# Patient Record
Sex: MALE
[De-identification: names, ages, dates, MRNs, and addresses within clinical notes are randomized per-mention and may not be internally consistent; named-entity substitution may affect disease eponyms.]

---

## 2021-09-05 PROBLEM — Z00.00 ENCOUNTER FOR PREVENTIVE HEALTH EXAMINATION: Status: ACTIVE | Noted: 2021-09-05

## 2021-09-07 ENCOUNTER — APPOINTMENT (OUTPATIENT)
Dept: OTOLARYNGOLOGY | Facility: CLINIC | Age: 61
End: 2021-09-07

## 2024-12-05 ENCOUNTER — EMERGENCY (EMERGENCY)
Facility: HOSPITAL | Age: 64
LOS: 1 days | Discharge: HOME | End: 2024-12-05
Admitting: STUDENT IN AN ORGANIZED HEALTH CARE EDUCATION/TRAINING PROGRAM
Payer: COMMERCIAL

## 2024-12-05 VITALS
SYSTOLIC BLOOD PRESSURE: 125 MMHG | TEMPERATURE: 98 F | RESPIRATION RATE: 18 BRPM | HEART RATE: 76 BPM | DIASTOLIC BLOOD PRESSURE: 82 MMHG | HEIGHT: 72 IN | OXYGEN SATURATION: 97 % | WEIGHT: 162.04 LBS

## 2024-12-05 DIAGNOSIS — Z23 ENCOUNTER FOR IMMUNIZATION: ICD-10-CM
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
## 2024-12-05 DIAGNOSIS — S71.131A PUNCTURE WOUND WITHOUT FOREIGN BODY, RIGHT THIGH, INITIAL ENCOUNTER: ICD-10-CM

## 2024-12-05 DIAGNOSIS — Z20.3 CONTACT WITH AND (SUSPECTED) EXPOSURE TO RABIES: ICD-10-CM
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
## 2024-12-05 PROCEDURE — 90675 RABIES VACCINE IM: CPT

## 2024-12-05 PROCEDURE — 96372 THER/PROPH/DIAG INJ SC/IM: CPT

## 2024-12-05 PROCEDURE — 90377 RABIES IG HT&SOL HUMAN IM/SC: CPT

## 2024-12-05 PROCEDURE — 90471 IMMUNIZATION ADMIN: CPT

## 2024-12-05 PROCEDURE — 99283 EMERGENCY DEPT VISIT LOW MDM: CPT | Mod: 25

## 2024-12-05 PROCEDURE — 99284 EMERGENCY DEPT VISIT MOD MDM: CPT

## 2024-12-05 RX ORDER — RABIES IMMUNE GLOBULIN/THIMER 150 UNIT/1
1450 VIAL (ML) INTRAMUSCULAR ONCE
Refills: 0 | Status: COMPLETED | OUTPATIENT
Start: 2024-12-05 | End: 2024-12-05

## 2024-12-05 RX ADMIN — Medication 1450 UNIT(S): at 14:15

## 2024-12-05 RX ADMIN — Medication 1 MILLILITER(S): at 14:12

## 2024-12-05 NOTE — ED ADULT TRIAGE NOTE - CHIEF COMPLAINT QUOTE
Pt presents to ED for a dog bite Sunday. Pt c/o redness and soreness on the dog bite site. Pt receives TDAP and received abx yesterday augmentin. Pt A&Ox4, NAD. Denies fever, chills. Sent here from  fro rabies shot.

## 2024-12-05 NOTE — ED PROVIDER NOTE - NSFOLLOWUPINSTRUCTIONS_ED_ALL_ED_FT
Return to ED for subsequent rabies vaccines:     Day 3: 12/8  Day 7: 12/12  Day 14: 12/19    Take antibiotics as prescribed      Rabies Vaccine    WHAT YOU NEED TO KNOW:    What is the rabies vaccine? The rabies vaccine can prevent rabies. Rabies is a serious illness caused by a virus. The rabies virus is spread to humans through the bite or scratch of an infected animal. Dogs, bats, skunks, coyotes, raccoons, and foxes are examples of animals that can carry rabies. The rabies vaccine can protect you from infection if you are at high risk of exposure. The vaccine can also prevent infection after you are bitten by an infected animal.    When is the vaccine given? The rabies vaccine is not part of the usual vaccination schedule. Your healthcare provider will give you an injection schedule. You should receive a vaccine if you have a higher risk of exposure to rabies. This might include people who work with animals who may be infected with rabies. You should also receive the vaccine after being bitten or scratched by an infected animal. The following is a common dosing schedule:    Before possible exposure to the virus, the vaccine is given in 2 doses. The first dose can be given at any time. The second dose is given 7 days after the first. You may need a booster dose within 3 years of the first 2 doses.    After exposure to the virus, the vaccine is usually given in 2 or 4 doses:  If you have received the rabies vaccine in the past, you usually only need 2 doses. The first is given immediately and the second is given 3 days later.    If you have not received the rabies vaccine, you need 4 doses over 2 weeks. The first dose is given as soon as possible after exposure to rabies. The following doses are given on days 3, 7, and 14. A shot called rabies immune globulin is given at the same time as the first dose. This medicine helps your immune system fight the infection.  What should I do if I miss a dose or will miss a scheduled dose? Call your healthcare provider right away. He or she will tell you what to do. The best way to be protected is to stay on the injection schedule given to you. This is especially important if you are getting the vaccine after exposure to the rabies virus. Reschedule any makeup dose or upcoming dose for as close to the original appointment as possible. Remember that you cannot get more than 1 dose on any day.    Who should not get the rabies vaccine or should wait to get it? Your healthcare provider may have you wait if you have not been exposed to rabies but are at high risk. If you have been exposed, you need to get the vaccine as soon as possible. Tell the provider if:    You had an allergic reaction to the rabies vaccine in the past, or to another vaccine.    You have any allergies.    You have a weakened immune system.    You take chloroquine or a similar medicine.    You are sick or have a fever of 101°F (38.3°C) or higher.  What are the risks of the rabies vaccine? The injection area may become red, tender, or swollen. You may develop a headache or muscle aches. You may have nausea or pain in your abdomen. You may have an allergic reaction to the vaccine. This can be life-threatening.    Call your local emergency number (911 in the [place]) or have someone call if:    Your mouth and throat are swollen.    You are wheezing or have trouble breathing.    You have chest pain or your heart is beating faster than normal for you.    You feel like you are going to faint.  When should I seek immediate care?    Your face is red or swollen.    You have hives that spread over your body.    You feel weak or dizzy.  When should I call my doctor?    You have increased pain, redness, or swelling around the injection area.    You have a headache, muscle aches, or abdominal pain.    You have flu-like symptoms.    You have questions or concerns about the rabies vaccine.  CARE AGREEMENT:    You have the right to help plan your care. Learn about your health condition and how it may be treated. Discuss treatment options with your healthcare providers to decide what care you want to receive. You always have the right to refuse treatment.  Return to ED sooner for pain, fever, chills, swelling, redness, drainage from the wound

## 2024-12-05 NOTE — ED ADULT NURSE NOTE - OBJECTIVE STATEMENT
64y male to the ER via triage from urgent care c/o of dog bite. Pt reports that was bit by a Maori Gregg on Sunday while in [facility]. Pt reports the site got red so pt went to urgent care- received tetanus shot and was sent for rabies vaccine.

## 2024-12-05 NOTE — ED PROVIDER NOTE - PATIENT PORTAL LINK FT
You can access the FollowMyHealth Patient Portal offered by [facility] by registering at the following website: [email or web address]. By joining [employer]’s FollowMyHealth portal, you will also be able to view your health information using other applications (apps) compatible with our system.

## 2024-12-05 NOTE — ED PROVIDER NOTE - OBJECTIVE STATEMENT
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 Pt. went to  on Wednesday and was given Augmentin and tetanus shot.  instructed pt to obtain rabies vaccine at ED. Vaccination status of dog is unknown. Denies any pain currently, fever, and chills. No other complaints at this time.

## 2024-12-05 NOTE — ED PROVIDER NOTE - PHYSICAL EXAMINATION
CONSTITUTIONAL: Well-appearing;  in no apparent distress.   HEAD: Normocephalic; atraumatic.   SKIN: R upper lateral thigh- healing puncture wound with scab formation and surrounding ecchymosis, no erythema, warmth or drainage

## 2024-12-05 NOTE — ED PROVIDER NOTE - CLINICAL SUMMARY MEDICAL DECISION MAKING FREE TEXT BOX
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 Pt. went to  on Wednesday and was given Augmentin and tetanus shot. R upper lateral thigh- healing puncture wound with scab formation and surrounding ecchymosis, no erythema, warmth or drainage. Vaccine and IG given

## 2024-12-08 ENCOUNTER — EMERGENCY (EMERGENCY)
Facility: HOSPITAL | Age: 64
LOS: 1 days | Discharge: HOME | End: 2024-12-08
Attending: STUDENT IN AN ORGANIZED HEALTH CARE EDUCATION/TRAINING PROGRAM | Admitting: EMERGENCY MEDICINE
Payer: COMMERCIAL

## 2024-12-08 VITALS
HEART RATE: 86 BPM | DIASTOLIC BLOOD PRESSURE: 85 MMHG | HEIGHT: 72 IN | TEMPERATURE: 97 F | SYSTOLIC BLOOD PRESSURE: 128 MMHG | WEIGHT: 162.04 LBS | RESPIRATION RATE: 18 BRPM | OXYGEN SATURATION: 98 %

## 2024-12-08 PROCEDURE — 90675 RABIES VACCINE IM: CPT

## 2024-12-08 PROCEDURE — 90471 IMMUNIZATION ADMIN: CPT

## 2024-12-08 PROCEDURE — L9995: CPT

## 2024-12-08 PROCEDURE — 99281 EMR DPT VST MAYX REQ PHY/QHP: CPT | Mod: 25

## 2024-12-08 RX ADMIN — Medication 1 MILLILITER(S): at 14:43

## 2024-12-08 NOTE — ED ADULT TRIAGE NOTE - CHIEF COMPLAINT QUOTE
"I need my second rabies vaccine". S/p dog bite on Thursday. Denies fevers, chills, bleeding or drainage, redness from area of bite. A/Ox4 .Ambulatory. Unlabored even respirations. NAD. "I need my second rabies vaccine". S/p dog bite on Thursday. Denies fevers, chills, bleeding or drainage, redness from area of bite. was prescribed Augmentin but has not been taking it because he left it at another residence far away. A/Ox4 .Ambulatory. Unlabored even respirations. NAD.

## 2024-12-08 NOTE — ED PROVIDER NOTE - OBJECTIVE STATEMENT
63 yo M presents for second rabies vaccination. He was bit by a dog to his right thigh on Sunday 12/1/24. He was seen at urgent care on Wednesday 12/4/24, and he was given a Tdap booster and an Augmentin prescription. He was seen here on Thursday 12/5/24 for rabies vaccine and Ig. He states that the right thigh dog bite would has been healing well. No pain, swelling, redness, drainage. He returns today for his second rabies vaccination.

## 2024-12-08 NOTE — ED PROVIDER NOTE - SKIN, MLM
Well healing right thigh wound, mild scabbing, no cellulitis/abscess. Nontender, no drainage. Skin normal color for race, warm, dry and intact. No evidence of rash.

## 2024-12-08 NOTE — ED ADULT NURSE NOTE - OBJECTIVE STATEMENT
Came for scheduled rabies show regiment. Next shot on 12/12. Tolerated first injection well with no adverse reaction. Pt taking abx as prescribed.

## 2024-12-08 NOTE — ED ADULT NURSE NOTE - CHIEF COMPLAINT QUOTE
"I need my second rabies vaccine". S/p dog bite on Thursday. Denies fevers, chills, bleeding or drainage, redness from area of bite. was prescribed Augmentin but has not been taking it because he left it at another residence far away. A/Ox4 .Ambulatory. Unlabored even respirations. NAD.

## 2024-12-08 NOTE — ED PROVIDER NOTE - CLINICAL SUMMARY MEDICAL DECISION MAKING FREE TEXT BOX
63 yo M presents for second rabies vaccination. Patient is non-toxic appearing, no acute distress, normal vital signs with no fever, tachycardia, hypotension, or hypoxia.    Right thigh wound well healing. No cellulitis or abscess. Instructed patient to take Augmentin as prescribed and finish course. He has already had a Tdap update. Animal control already contacted.     Will give second rabies vaccination today. He has Day 7 on 12/12 and Day 14 on 12/19.     Discussed plan, answered all questions, and addressed all concerns. Reviewed strict return precautions. Patient verbalized understanding and agreement with treatment plan, return precautions, and discharge instructions. Instructed patient to return for any new, worsening, or concerning symptoms. Detail Level: Detailed Hide Additional Notes?: No Detail Level: Zone

## 2024-12-10 DIAGNOSIS — S71.151D OPEN BITE, RIGHT THIGH, SUBSEQUENT ENCOUNTER: ICD-10-CM

## 2024-12-10 DIAGNOSIS — Z23 ENCOUNTER FOR IMMUNIZATION: ICD-10-CM

## 2024-12-12 ENCOUNTER — EMERGENCY (EMERGENCY)
Facility: HOSPITAL | Age: 64
LOS: 1 days | Discharge: HOME | End: 2024-12-12
Attending: STUDENT IN AN ORGANIZED HEALTH CARE EDUCATION/TRAINING PROGRAM | Admitting: STUDENT IN AN ORGANIZED HEALTH CARE EDUCATION/TRAINING PROGRAM
Payer: COMMERCIAL

## 2024-12-12 VITALS
OXYGEN SATURATION: 98 % | TEMPERATURE: 98 F | SYSTOLIC BLOOD PRESSURE: 118 MMHG | RESPIRATION RATE: 18 BRPM | DIASTOLIC BLOOD PRESSURE: 84 MMHG | HEART RATE: 77 BPM | HEIGHT: 72 IN | WEIGHT: 162.04 LBS

## 2024-12-12 PROCEDURE — 99281 EMR DPT VST MAYX REQ PHY/QHP: CPT | Mod: 25

## 2024-12-12 PROCEDURE — 90471 IMMUNIZATION ADMIN: CPT

## 2024-12-12 PROCEDURE — L9995: CPT

## 2024-12-12 PROCEDURE — 90675 RABIES VACCINE IM: CPT

## 2024-12-12 RX ADMIN — Medication 1 MILLILITER(S): at 17:46

## 2024-12-12 NOTE — ED PROVIDER NOTE - PATIENT PORTAL LINK FT
You can access the FollowMyHealth Patient Portal offered by [facility] by registering at the following website: [email or web address]. By joining [employer]’s FollowMyHealth portal, you will also be able to view your health information using other applications (apps) compatible with our system.

## 2024-12-12 NOTE — ED PROVIDER NOTE - OBJECTIVE STATEMENT
65 yo M presents for 3rd rabies vaccination. He was bit by a dog to his right thigh on Sunday 12/1/24. He was seen at urgent care on Wednesday 12/4/24, and he was given a Tdap booster and an Augmentin prescription. He was seen here on Thursday 12/5/24 for rabies vaccine and Ig. 2nd vaccine 12/8. now here for 3rd one.  denied reaction to the IVIG or vaccine. He states that the right thigh dog bite would has been healing well. No pain, swelling, redness, drainage. .

## 2024-12-12 NOTE — ED ADULT NURSE NOTE - OBJECTIVE STATEMENT
64y M presents to ED c/o rabies follow up. Initial wound to R thigh from dog bite. Denies f/c, drainage/bleeding from wound, other acute sx. Pt AAOx4, speaking in full and clear sentences, NAd at this time.

## 2024-12-12 NOTE — ED PROVIDER NOTE - NSFOLLOWUPINSTRUCTIONS_ED_ALL_ED_FT
Please return on 12/19 for the 4th rabies vaccination. please return to the ED if you noticed any swelling/redness/pain to injection site or wound site or other symptoms of concern.

## 2024-12-12 NOTE — ED PROVIDER NOTE - CLINICAL SUMMARY MEDICAL DECISION MAKING FREE TEXT BOX
here for 3rd rabies vaccine. pt has no other medical complain. next shot 12/19. return precaution given.

## 2024-12-12 NOTE — ED ADULT NURSE NOTE - NS ED NURSE RECORD ANOTHER HT AND WT
VSS, maintains POX on RA. Peripad in place with moderate amount bloody drainage. Denies pain. Tolerating PO intake. Given d/c instructions- pt and spouse verbalized understanding, all questions answered. Ambulated to BR unassisted, able to void large amount. Post-ambulatory /50 HR 87. Pt denies feeling lightheaded/dizzy. D/C criteria met, x2 IVs removed. Transported to vehicle via wheelchair with RN. Pt's spouse to transport home.    Yes

## 2024-12-12 NOTE — ED ADULT TRIAGE NOTE - CHIEF COMPLAINT QUOTE
"I am here for my third rabies vaccine. I was bit on my right thigh." Denies fevers/chills, redness or drainage at wound site.

## 2024-12-16 DIAGNOSIS — Z20.3 CONTACT WITH AND (SUSPECTED) EXPOSURE TO RABIES: ICD-10-CM

## 2024-12-16 DIAGNOSIS — Z23 ENCOUNTER FOR IMMUNIZATION: ICD-10-CM

## 2024-12-19 ENCOUNTER — EMERGENCY (EMERGENCY)
Facility: HOSPITAL | Age: 64
LOS: 1 days | Discharge: HOME | End: 2024-12-19
Admitting: STUDENT IN AN ORGANIZED HEALTH CARE EDUCATION/TRAINING PROGRAM
Payer: COMMERCIAL

## 2024-12-19 VITALS
SYSTOLIC BLOOD PRESSURE: 114 MMHG | TEMPERATURE: 98 F | OXYGEN SATURATION: 97 % | HEART RATE: 85 BPM | DIASTOLIC BLOOD PRESSURE: 77 MMHG | WEIGHT: 162.04 LBS | HEIGHT: 72 IN | RESPIRATION RATE: 18 BRPM

## 2024-12-19 DIAGNOSIS — Z20.3 CONTACT WITH AND (SUSPECTED) EXPOSURE TO RABIES: ICD-10-CM

## 2024-12-19 DIAGNOSIS — Z23 ENCOUNTER FOR IMMUNIZATION: ICD-10-CM

## 2024-12-19 PROCEDURE — 99281 EMR DPT VST MAYX REQ PHY/QHP: CPT | Mod: 25

## 2024-12-19 PROCEDURE — L9995: CPT

## 2024-12-19 PROCEDURE — 90675 RABIES VACCINE IM: CPT

## 2024-12-19 PROCEDURE — 90471 IMMUNIZATION ADMIN: CPT

## 2024-12-19 RX ADMIN — Medication 1 MILLILITER(S): at 17:06

## 2024-12-19 NOTE — ED PROVIDER NOTE - CLINICAL SUMMARY MEDICAL DECISION MAKING FREE TEXT BOX
65 y/o m presents for 4th rabies vaccine of series after dog bite.  Pt with no complaints, given rabies vaccine, f/u pmd

## 2024-12-19 NOTE — ED PROVIDER NOTE - PATIENT PORTAL LINK FT
You can access the FollowMyHealth Patient Portal offered by [facility] by registering at the following website: [email or web address]. By joining [employer]’s FollowMyHealth portal, you will also be able to view your health information using other applications (apps) compatible with our system.

## 2024-12-19 NOTE — ED PROVIDER NOTE - OBJECTIVE STATEMENT
65 y/o m presents for 4th rabies vaccine of the series after dog bite.  Dog bite occurred 12/1, reports wound has fully healed, has no complaints.